# Patient Record
Sex: FEMALE | Race: OTHER | HISPANIC OR LATINO | Employment: FULL TIME | URBAN - METROPOLITAN AREA
[De-identification: names, ages, dates, MRNs, and addresses within clinical notes are randomized per-mention and may not be internally consistent; named-entity substitution may affect disease eponyms.]

---

## 2022-01-20 ENCOUNTER — OFFICE VISIT (OUTPATIENT)
Dept: OBGYN CLINIC | Facility: HOSPITAL | Age: 37
End: 2022-01-20
Payer: OTHER MISCELLANEOUS

## 2022-01-20 ENCOUNTER — HOSPITAL ENCOUNTER (OUTPATIENT)
Dept: RADIOLOGY | Facility: HOSPITAL | Age: 37
Discharge: HOME | End: 2022-01-20
Payer: OTHER MISCELLANEOUS

## 2022-01-20 VITALS
HEIGHT: 65 IN | DIASTOLIC BLOOD PRESSURE: 75 MMHG | HEART RATE: 80 BPM | WEIGHT: 122.6 LBS | SYSTOLIC BLOOD PRESSURE: 107 MMHG | BODY MASS INDEX: 20.43 KG/M2

## 2022-01-20 VITALS
SYSTOLIC BLOOD PRESSURE: 107 MMHG | HEIGHT: 65 IN | BODY MASS INDEX: 20.33 KG/M2 | DIASTOLIC BLOOD PRESSURE: 75 MMHG | HEART RATE: 80 BPM | WEIGHT: 122 LBS

## 2022-01-20 DIAGNOSIS — M25.531 RIGHT WRIST PAIN: ICD-10-CM

## 2022-01-20 DIAGNOSIS — S69.81XA TFCC (TRIANGULAR FIBROCARTILAGE COMPLEX) INJURY, RIGHT, INITIAL ENCOUNTER: ICD-10-CM

## 2022-01-20 DIAGNOSIS — M25.531 RIGHT WRIST PAIN: Primary | ICD-10-CM

## 2022-01-20 DIAGNOSIS — S93.622A LISFRANC'S SPRAIN, LEFT, INITIAL ENCOUNTER: ICD-10-CM

## 2022-01-20 DIAGNOSIS — M79.672 PAIN IN LEFT FOOT: ICD-10-CM

## 2022-01-20 DIAGNOSIS — M79.672 PAIN IN LEFT FOOT: Primary | ICD-10-CM

## 2022-01-20 PROCEDURE — 99203 OFFICE O/P NEW LOW 30 MIN: CPT | Performed by: PHYSICIAN ASSISTANT

## 2022-01-20 PROCEDURE — 73630 X-RAY EXAM OF FOOT: CPT

## 2022-01-20 PROCEDURE — 73110 X-RAY EXAM OF WRIST: CPT

## 2022-01-20 RX ORDER — MELOXICAM 15 MG/1
15 TABLET ORAL DAILY
Qty: 30 TABLET | Refills: 0 | Status: SHIPPED | OUTPATIENT
Start: 2022-01-20

## 2022-01-20 RX ORDER — NAPROXEN 500 MG/1
500 TABLET ORAL 2 TIMES DAILY
COMMUNITY
Start: 2022-01-11 | End: 2022-01-20 | Stop reason: ALTCHOICE

## 2022-01-20 NOTE — LETTER
January 20, 2022     Patient: Lea Franco   YOB: 1990   Date of Visit: 1/20/2022         To Whom it May Concern:     Lea Franco was seen in my office on 1/20/2022  She may return to work with limitations   Limitations include no lifting or pushing greater than 5 to 10 lb specifically for the right wrist   These restrictions are in place until the next orthopedic appointment recommended in 4 times from the date of this appointment      If you have any questions or concerns, please don't hesitate to call            Sincerely,            Alix Fishman PA-C            CC:  Lea Franco

## 2022-01-20 NOTE — LETTER
January 20, 2022     Patient: Ezell Paget   YOB: 1990   Date of Visit: 1/20/2022       To Whom it May Concern:    Ezell Paget was seen in my office on 1/20/2022  She may return to work with limitations   Limitations include no lifting or pushing greater than 5 to 10 lb  These restrictions are in place for 4 weeks or until her next follow-up appointment with Orthopedics  If you have any questions or concerns, please don't hesitate to call           Sincerely,          Berry Crow PA-C        CC: Ezell Paget

## 2022-01-20 NOTE — PROGRESS NOTES
Assessment:    Left foot pain s/p forefoot injury      Plan:    Obtain MRI left foot for further evaluation  WBAT in CAM boot  Will call with results  Patient traveling will follow-up elsewhere     Problem List Items Addressed This Visit        Other    Pain in left foot - Primary    Relevant Orders    XR foot 3+ vw left      Other Visit Diagnoses     Lisfranc's sprain, left, initial encounter        Relevant Orders    Durable Medical Equipment    MRI foot/forefoot toes left wo contrast                   Subjective:     Patient ID:  David Montes is a 27 y o  female    HPI    The patient is a 60-year-old female presenting for evaluation of her left foot  According to the patient, about one week ago she was breaking down [place] after concert and a metal pole fell onto her left foot  She felt immediate pain with associated numbness in the forefoot region with radiation to the first and second toes  She states over the last week has become less painful and she ambulates without assistance however it is still noticeable  She still has some residual numbness  She states it is painful when she touches the area between her first and second toes  The following portions of the patient's history were reviewed and updated as appropriate: allergies, current medications, past family history, past medical history, past social history, past surgical history and problem list     Review of Systems     Objective:    Imaging:  Left foot x-rays demonstrate no acute fracture or dislocation  Vitals:    01/20/22 0807   BP: 107/75   Pulse: 80           Physical Exam     Orthopedic Examination:  Left foot    Inspection:  No open wound or erythema  No swelling  No ecchymosis    Palpation:  No warmth  TTP Lisfranc complex  Medial and lateral malleoli NTTP  Calcaneus and plantar surface are NTTP      Range-of-motion:  Normal ankle DF/PF    Strength:  5/5 ankle DF/PF, EHL/FHL    Sensation:  Slightly diminished in the second toe    Special Tests:  Good cap refill at the toes,

## 2022-01-20 NOTE — PROGRESS NOTES
Assessment:    TFCC injury, right       Plan:    Continue immobilization with removable wrist brace   Rest and activity modification as able   Rx Mobic  Patient is traveling for work, [place] of state         Problem List Items Addressed This Visit     None      Visit Diagnoses     Right wrist pain    -  Primary    Relevant Orders    XR wrist 3+ vw right                   Subjective:     Patient ID:  Itzel Torrez is a 27 y o  female    HPI    The patient is a 69-year-old female presenting for evaluation of her right wrist   According to the patient, she works for traveling [employer] and about one month ago she slipped and fell onto her buttocks in her palms were out and she felt immediate pain the ulnar side of her right wrist   Initially it felt sharp and radiates to the palm and distal forearm  X-rays at that time were negative for acute fracture and she was given a wrist brace  Over the last several weeks she has been wearing a brace and her symptoms are improved  She no longer feels shooting pain however it is more dull localized to the ulnar side without significant radiation  No associated numbness and tingling  She is able to move her wrist without significant pain  No snapping noise with range of motion  The following portions of the patient's history were reviewed and updated as appropriate: allergies, current medications, past family history, past medical history, past social history, past surgical history and problem list     Review of Systems     Objective:    Imaging:  Right wrist x-rays performed today demonstrate no acute fracture or dislocation    Vitals:    01/20/22 0800   BP: 107/75   Pulse: 80       Physical Exam     Orthopedic Examination:  Right wrist    Inspection:  No open wound or erythema  No ecchymosis  No swelling  Palpation:  Tender to palpation distal ulna    Distal radius, metacarpal bones, anatomic snuffbox nontender to palpation    Range-of-motion: Normal range of motion of the wrist    Strength:  5/5 wrist flexion extension,  strength, intrinsics    Sensation:  Intact median radial ulnar nerve distribution    Special Tests:  + Fovea sign

## 2022-01-21 ENCOUNTER — HOSPITAL ENCOUNTER (OUTPATIENT)
Dept: MRI IMAGING | Facility: HOSPITAL | Age: 37
Discharge: HOME | End: 2022-01-21
Payer: OTHER MISCELLANEOUS

## 2022-01-21 DIAGNOSIS — S93.622A LISFRANC'S SPRAIN, LEFT, INITIAL ENCOUNTER: ICD-10-CM

## 2022-01-21 PROCEDURE — G1004 CDSM NDSC: HCPCS

## 2022-01-21 PROCEDURE — 73718 MRI LOWER EXTREMITY W/O DYE: CPT
